# Patient Record
Sex: FEMALE | Race: OTHER | URBAN - METROPOLITAN AREA
[De-identification: names, ages, dates, MRNs, and addresses within clinical notes are randomized per-mention and may not be internally consistent; named-entity substitution may affect disease eponyms.]

---

## 2022-11-04 ENCOUNTER — OFFICE VISIT (OUTPATIENT)
Dept: FAMILY MEDICINE CLINIC | Age: 37
End: 2022-11-04
Payer: COMMERCIAL

## 2022-11-04 VITALS
RESPIRATION RATE: 18 BRPM | WEIGHT: 188 LBS | TEMPERATURE: 97.4 F | DIASTOLIC BLOOD PRESSURE: 90 MMHG | OXYGEN SATURATION: 98 % | BODY MASS INDEX: 35.5 KG/M2 | HEART RATE: 70 BPM | HEIGHT: 61 IN | SYSTOLIC BLOOD PRESSURE: 138 MMHG

## 2022-11-04 DIAGNOSIS — R06.02 SHORTNESS OF BREATH: ICD-10-CM

## 2022-11-04 DIAGNOSIS — R00.2 PALPITATIONS: ICD-10-CM

## 2022-11-04 DIAGNOSIS — Z11.59 NEED FOR HEPATITIS C SCREENING TEST: ICD-10-CM

## 2022-11-04 DIAGNOSIS — E78.5 HYPERLIPIDEMIA, UNSPECIFIED HYPERLIPIDEMIA TYPE: ICD-10-CM

## 2022-11-04 DIAGNOSIS — R06.83 SNORING: ICD-10-CM

## 2022-11-04 DIAGNOSIS — I10 HYPERTENSION, ESSENTIAL: Primary | ICD-10-CM

## 2022-11-04 DIAGNOSIS — K21.9 GASTROESOPHAGEAL REFLUX DISEASE WITHOUT ESOPHAGITIS: ICD-10-CM

## 2022-11-04 PROCEDURE — 93000 ELECTROCARDIOGRAM COMPLETE: CPT | Performed by: NURSE PRACTITIONER

## 2022-11-04 PROCEDURE — 99204 OFFICE O/P NEW MOD 45 MIN: CPT | Performed by: NURSE PRACTITIONER

## 2022-11-04 PROCEDURE — 3080F DIAST BP >= 90 MM HG: CPT | Performed by: NURSE PRACTITIONER

## 2022-11-04 PROCEDURE — 3075F SYST BP GE 130 - 139MM HG: CPT | Performed by: NURSE PRACTITIONER

## 2022-11-04 RX ORDER — PANTOPRAZOLE SODIUM 40 MG/1
40 TABLET, DELAYED RELEASE ORAL DAILY
Qty: 90 TABLET | Refills: 1 | Status: SHIPPED | OUTPATIENT
Start: 2022-11-04

## 2022-11-04 RX ORDER — LISINOPRIL 5 MG/1
5 TABLET ORAL DAILY
Qty: 90 TABLET | Refills: 1 | Status: SHIPPED | OUTPATIENT
Start: 2022-11-04

## 2022-11-04 RX ORDER — AMLODIPINE BESYLATE AND ATORVASTATIN CALCIUM 10; 20 MG/1; MG/1
1 TABLET, FILM COATED ORAL DAILY
COMMUNITY

## 2022-11-04 NOTE — PROGRESS NOTES
Margarita Antonio (: 1985) is a 44 y.o. female, new patient, here for evaluation of the following chief complaint(s):  Establish Care, Headache, Sore Throat, Abdominal Pain, and Dizziness    ASSESSMENT/PLAN:  Below is the assessment and plan developed based on review of pertinent history, physical exam, labs, studies, and medications. Based on today's findings we will proceed as follows:     1. Hypertension, essential  -     CBC WITH AUTOMATED DIFF  -     METABOLIC PANEL, COMPREHENSIVE  -     LIPID PANEL  -     THYROID CASCADE PROFILE  -     lisinopriL (PRINIVIL, ZESTRIL) 5 mg tablet; Take 1 Tablet by mouth daily. Indications: high blood pressure, migraine prevention, Normal, Disp-90 Tablet, R-1  -     AMB POC EKG ROUTINE W/ 12 LEADS, INTER & REP  2. Hyperlipidemia, unspecified hyperlipidemia type  -     CBC WITH AUTOMATED DIFF  -     METABOLIC PANEL, COMPREHENSIVE  -     LIPID PANEL  -     THYROID CASCADE PROFILE  -     MICROALBUMIN, UR, RAND W/ MICROALB/CREAT RATIO  3. Need for hepatitis C screening test  -     HEPATITIS C AB; Future  Due to her symptoms at this time we will perform an EKG in clinic. EKG: normal EKG, normal sinus rhythm, unchanged from previous tracings. With elevated blood pressure at this time we will add lisinopril 5 mg to her regimen to better manage blood pressure. For her GERD patient has been prescribed Protonix to be taken in the morning  Educated on medication, uses, side effects, as well as signs and symptoms that may merit immediate medical attention. Patient is not to share medication and use only as indicated. Patient verbalized understanding and agreement. If symptoms dont improve or worsen call the clinic or go to urgent in the 2-3 days for further evaluation  Given that she has sustained palpitations, dizziness, headaches, snoring, chest tightness patient has also been referred to cardiology for further evaluation.     Routine labs have been ordered. Continue with all other medication as directed. Issues discussed include maintaining a diet, weight control and exercise, continued home blood pressure monitoring, maintaining medication compliance, medication side effects, long term hypertension complications, and refraining from tobacco use. Treatment plan: Continue current medications, no changes, maintaining low sodium diet, blood pressure goal discussed, labwork from today discussed and reviewed, future labwork ordered, A1C, lipids check, patient up to date on maintence and screening exams. Return in about 3 months (around 2/4/2023) for HTN, Hyperlipidemia, Lab review, post sleep study post cardiology, new lisinopril 5 mg. .      SUBJECTIVE/OBJECTIVE:  HPI    Patient is a 44 y.o. F with a history of hypertension and hyperlipidemia presents today with concerns of palpitations, cp, elevated BP, not all the time, with shortness of breath,2 times per 15 days x 6 months, lasting a few seconds, has been taking medication as directed. At times feels \"a sore throat that radiates to the mandible\". Every other day. No symptoms today. Have been told by  she has loud snoring. Dizzy spells that happen at times when walking and sudden dizzy spell frequent most days, lasting a few seconds, with recurrent headache. No symptoms now except for ha, take ibuprofen which helps some. Also with GERD for the past year, feel bloated with all meals, does not take anything for it, with occasional nausea. Review of Systems  All other systems reviewed and are negative. Visit Vitals  BP (!) 138/90 (BP 1 Location: Left upper arm, BP Patient Position: Sitting, BP Cuff Size: Large adult)   Pulse 70   Temp 97.4 °F (36.3 °C) (Temporal)   Resp 18   Ht 5' 1\" (1.549 m)   Wt 188 lb (85.3 kg)   LMP 10/04/2022   SpO2 98%   BMI 35.52 kg/m²       Physical Exam  Constitutional:  No acute distress  HEENT:  Head normocephalic and atraumatic. CV:  Regular rate and rhythm.  No murmur. Respiratory:  Lungs clear to auscultation bilaterally  Abdomen:  Soft, non-tender. Skin:  Normal color. Warm and Dry  Extremities:  Non-tender. No pedal edema. Back:  No tenderness  Neuro:  No gross motor deficits    On this date 11/04/2022 I have spent 45 minutes reviewing previous notes, test results and face to face with the patient discussing the diagnosis and importance of compliance with the treatment plan as well as documenting on the day of the visit. Aspects of this note may have been generated using voice recognition software. Despite editing, there may be some syntax errors. An electronic signature was used to authenticate this note.   -- Molly Noyola NP

## 2022-11-04 NOTE — PROGRESS NOTES
1. Have you been to the ER, urgent care clinic since your last visit? Hospitalized since your last visit? No    2. Have you seen or consulted any other health care providers outside of the [address] [place] [address] since your last visit? Include any pap smears or colon screening.  No    Chief Complaint   Patient presents with    Establish Care    Headache    Sore Throat    Abdominal Pain    Dizziness     Visit Vitals  BP (!) 138/90 (BP 1 Location: Left upper arm, BP Patient Position: Sitting, BP Cuff Size: Large adult)   Pulse 70   Temp 97.4 °F (36.3 °C) (Temporal)   Resp 18   Ht 5' 1\" (1.549 m)   Wt 188 lb (85.3 kg)   LMP 10/04/2022   SpO2 98%   BMI 35.52 kg/m²

## 2023-02-10 ENCOUNTER — OFFICE VISIT (OUTPATIENT)
Dept: FAMILY MEDICINE CLINIC | Age: 38
End: 2023-02-10
Payer: COMMERCIAL

## 2023-02-10 VITALS
OXYGEN SATURATION: 98 % | TEMPERATURE: 98.7 F | WEIGHT: 192.8 LBS | HEIGHT: 61 IN | HEART RATE: 72 BPM | DIASTOLIC BLOOD PRESSURE: 85 MMHG | BODY MASS INDEX: 36.4 KG/M2 | SYSTOLIC BLOOD PRESSURE: 137 MMHG

## 2023-02-10 DIAGNOSIS — K21.9 GASTROESOPHAGEAL REFLUX DISEASE WITHOUT ESOPHAGITIS: ICD-10-CM

## 2023-02-10 DIAGNOSIS — M51.26 LUMBAR HERNIATED DISC: Primary | ICD-10-CM

## 2023-02-10 DIAGNOSIS — E78.5 HYPERLIPIDEMIA LDL GOAL <130: ICD-10-CM

## 2023-02-10 DIAGNOSIS — I10 PRIMARY HYPERTENSION: ICD-10-CM

## 2023-02-10 PROCEDURE — 3074F SYST BP LT 130 MM HG: CPT | Performed by: NURSE PRACTITIONER

## 2023-02-10 PROCEDURE — 3078F DIAST BP <80 MM HG: CPT | Performed by: NURSE PRACTITIONER

## 2023-02-10 PROCEDURE — 99214 OFFICE O/P EST MOD 30 MIN: CPT | Performed by: NURSE PRACTITIONER

## 2023-02-10 RX ORDER — PREDNISONE 20 MG/1
20 TABLET ORAL 2 TIMES DAILY WITH MEALS
Qty: 20 TABLET | Refills: 0 | Status: SHIPPED | OUTPATIENT
Start: 2023-02-10

## 2023-02-10 RX ORDER — NAPROXEN 500 MG/1
500 TABLET ORAL 2 TIMES DAILY WITH MEALS
Qty: 60 TABLET | Refills: 5 | Status: SHIPPED | OUTPATIENT
Start: 2023-02-10

## 2023-02-10 RX ORDER — AMLODIPINE BESYLATE 5 MG/1
5 TABLET ORAL DAILY
Qty: 90 TABLET | Refills: 3 | Status: SHIPPED | OUTPATIENT
Start: 2023-02-10

## 2023-02-10 RX ORDER — AMLODIPINE BESYLATE 5 MG/1
5 TABLET ORAL DAILY
COMMUNITY
Start: 2023-01-11 | End: 2023-02-10 | Stop reason: SDUPTHER

## 2023-02-10 RX ORDER — PANTOPRAZOLE SODIUM 40 MG/1
40 TABLET, DELAYED RELEASE ORAL DAILY
Qty: 90 TABLET | Refills: 3 | Status: SHIPPED | OUTPATIENT
Start: 2023-02-10

## 2023-02-10 RX ORDER — AMLODIPINE BESYLATE AND ATORVASTATIN CALCIUM 10; 20 MG/1; MG/1
1 TABLET, FILM COATED ORAL DAILY
Qty: 90 TABLET | Refills: 3 | Status: SHIPPED | OUTPATIENT
Start: 2023-02-10

## 2023-02-10 RX ORDER — ATORVASTATIN CALCIUM 10 MG/1
10 TABLET, FILM COATED ORAL
Qty: 90 TABLET | Refills: 3 | Status: SHIPPED | OUTPATIENT
Start: 2023-02-10

## 2023-02-10 NOTE — PROGRESS NOTES
1. Have you been to the ER, urgent care clinic since your last visit? Hospitalized since your last visit? No    2. Have you seen or consulted any other health care providers outside of the [address] [place] [address] since your last visit? Include any pap smears or colon screening.  No    Chief Complaint   Patient presents with    Cholesterol Problem    Hypertension     Visit Vitals  /85 (BP 1 Location: Right upper arm, BP Patient Position: Sitting, BP Cuff Size: Large adult)   Pulse 72   Temp 98.7 °F (37.1 °C) (Temporal)   Ht 5' 1\" (1.549 m)   Wt 192 lb 12.8 oz (87.5 kg)   LMP  (LMP Unknown)   SpO2 98%   BMI 36.43 kg/m²

## 2023-02-10 NOTE — PATIENT INSTRUCTIONS
Los siguientes son suplementos de venta haley que pueden ayudar a controlar mejor el colesterol:  CoQ10 200 mg al radha  Arroz de levadura raji  Cúrcuma  niacina  Fibra soluble  Deale  Jengibre     Reform Naproxen 500 mg cuando tenga dolor de [employer] Prednisone 20 mg cuando tenga dolor loren jeet de la espalda.     Empieze Lipitor (atorvastatin) 10 mg todas las noches para el choleterol  Obtenga ella extraída 1 semana antes de fair seguimiento

## 2023-02-23 NOTE — PROGRESS NOTES
Subjective:   Jordan Leon is a 44 y.o. female  established here with complaints of   Chief Complaint   Patient presents with    Cholesterol Problem    Hypertension      Reports blood pressures at home most frequently not checked. Patient has symptoms of none of the following; no chest pain, shortness of breath, weakness, orthostatic hypotension, cough, myalgias, rash, headaches, weight gain, leg swelling, palpitations, slow heart rate, fatigue, depression. Patient reports good compliance with medications, no side effects from medications noted. Current treatments include diet modification, weight loss. Patient Active Problem List   Diagnosis Code    Hypertension, essential I10    Hyperlipidemia E78.5     Patient Active Problem List    Diagnosis Date Noted    Hypertension, essential 11/04/2022    Hyperlipidemia 11/04/2022     Current Outpatient Medications   Medication Sig Dispense Refill    naproxen (NAPROSYN) 500 mg tablet Take 1 Tablet by mouth two (2) times daily (with meals). When needed for pain. 60 Tablet 5    predniSONE (DELTASONE) 20 mg tablet Take 1 Tablet by mouth two (2) times daily (with meals). During flare ups. 20 Tablet 0    atorvastatin (LIPITOR) 10 mg tablet Take 1 Tablet by mouth nightly. Indications: excessive fat in the blood 90 Tablet 3    amLODIPine (NORVASC) 5 mg tablet Take 1 Tablet by mouth daily. Indications: high blood pressure 90 Tablet 3    amLODIPine-atorvastatin (CADUET) 10-20 mg per tablet Take 1 Tablet by mouth daily. Indications: high cholesterol, high blood pressure 90 Tablet 3    pantoprazole (PROTONIX) 40 mg tablet Take 1 Tablet by mouth daily. Indications: gastroesophageal reflux disease 90 Tablet 3    lisinopriL (PRINIVIL, ZESTRIL) 5 mg tablet Take 1 Tablet by mouth daily. Indications: high blood pressure, migraine prevention 90 Tablet 1     No Known Allergies  Past Medical History:   Diagnosis Date    Hypertension      History reviewed.  No pertinent surgical history. Family History   Problem Relation Age of Onset    Diabetes Mother      Social History     Tobacco Use    Smoking status: Never    Smokeless tobacco: Never   Substance Use Topics    Alcohol use: Never      Review of Systems    A comprehensive review of systems was negative except for that written in the HPI. Objective:     Visit Vitals  /85 (BP 1 Location: Right upper arm, BP Patient Position: Sitting, BP Cuff Size: Large adult)   Pulse 72   Temp 98.7 °F (37.1 °C) (Temporal)   Ht 5' 1\" (1.549 m)   Wt 192 lb 12.8 oz (87.5 kg)   LMP  (LMP Unknown)   SpO2 98%   BMI 36.43 kg/m²     General:  Alert, cooperative, no distress, appears stated age. Head:  Normocephalic, without obvious abnormality, atraumatic. Eyes:  Conjunctivae/corneas clear. PERRL, EOMs intact. Fundi benign. Ears:  Normal TMs and external ear canals both ears. Nose: Nares normal. Septum midline. Mucosa normal. No drainage or sinus tenderness. Throat: Lips, mucosa, and tongue normal. Teeth and gums normal.   Neck: Supple, symmetrical, trachea midline, no adenopathy, thyroid: no enlargement/tenderness/nodules, no carotid bruit and no JVD. Back:   Symmetric, no curvature. ROM normal. No CVA tenderness. Lungs:   Clear to auscultation bilaterally. Chest wall:  No tenderness or deformity. Heart:  Regular rate and rhythm, S1, S2 normal, no murmur, click, rub or gallop. Assessment/Plan:       ICD-10-CM ICD-9-CM    1. Lumbar herniated disc  M51.26 722.10 naproxen (NAPROSYN) 500 mg tablet      predniSONE (DELTASONE) 20 mg tablet      2.  Hyperlipidemia LDL goal <130  E78.5 272.4 atorvastatin (LIPITOR) 10 mg tablet      amLODIPine-atorvastatin (CADUET) 10-20 mg per tablet      CBC WITH AUTOMATED DIFF      METABOLIC PANEL, COMPREHENSIVE      LIPID PANEL      3. Primary hypertension  I10 401.9 amLODIPine (NORVASC) 5 mg tablet      amLODIPine-atorvastatin (CADUET) 10-20 mg per tablet      CBC WITH AUTOMATED DIFF METABOLIC PANEL, COMPREHENSIVE      LIPID PANEL      4. Gastroesophageal reflux disease without esophagitis  K21.9 530.81 pantoprazole (PROTONIX) 40 mg tablet        Follow-up and Dispositions    Return in about 6 months (around 8/10/2023) for Hyperlipidemia, Low back pain prednisone/naproxen flareups? .        Will continue with the Amlodipine 15 per cardiology  Will increase Lipitor to 30 mg QHS  Educated on medication, uses, side effects, as well as signs and symptoms that may merit immediate medical attention. Patient is not to share medication and use only as indicated. Patient verbalized understanding and agreement. Issues discussed include maintaining a proper diet, maintaining medication compliance, medication side effects, future potential medication changes if needed. Treatment plan: Continue current medications, labwork from today discussed and reviewed, future labwork ordered, A1C, lipids check, patient up to date on maintence and screening exams. Follow-up and Dispositions    Return in about 6 months (around 8/10/2023) for Hyperlipidemia, Low back pain prednisone/naproxen flareups? Iesha Rai On this date 02/10/2023 I have spent 33 minutes reviewing previous notes, test results and face to face (virtual) with the patient discussing the diagnosis and importance of compliance with the treatment plan as well as documenting on the day of the visit. Aspects of this note may have been generated using voice recognition software. Despite editing, there may be some syntax errors.     Valeri Ronquillo NP

## 2023-03-15 DIAGNOSIS — E78.5 HYPERLIPIDEMIA LDL GOAL <130: ICD-10-CM

## 2023-03-16 RX ORDER — ATORVASTATIN CALCIUM 10 MG/1
10 TABLET, FILM COATED ORAL
Qty: 90 TABLET | Refills: 3 | Status: SHIPPED | OUTPATIENT
Start: 2023-03-16

## 2023-03-16 NOTE — TELEPHONE ENCOUNTER
Requested Prescriptions     Pending Prescriptions Disp Refills    atorvastatin (LIPITOR) 10 mg tablet 90 Tablet 3     Sig: Take 1 Tablet by mouth nightly.  Indications: excessive fat in the blood        Last OV:11/4/22  Next OV:8/11/23  Last Refill:2/10/23  Qty 90  Refills 3

## 2023-07-18 LAB
ALBUMIN SERPL-MCNC: 4 G/DL (ref 3.9–4.9)
ALBUMIN/GLOB SERPL: 1.7 {RATIO} (ref 1.2–2.2)
ALP SERPL-CCNC: 54 IU/L (ref 44–121)
ALT SERPL-CCNC: 16 IU/L (ref 0–32)
AST SERPL-CCNC: 12 IU/L (ref 0–40)
BASOPHILS # BLD AUTO: 0 X10E3/UL (ref 0–0.2)
BASOPHILS NFR BLD AUTO: 0 %
BILIRUB SERPL-MCNC: <0.2 MG/DL (ref 0–1.2)
BUN SERPL-MCNC: 11 MG/DL (ref 6–20)
BUN/CREAT SERPL: 16 (ref 9–23)
CALCIUM SERPL-MCNC: 9.4 MG/DL (ref 8.7–10.2)
CHLORIDE SERPL-SCNC: 107 MMOL/L (ref 96–106)
CHOLEST SERPL-MCNC: 129 MG/DL (ref 100–199)
CO2 SERPL-SCNC: 21 MMOL/L (ref 20–29)
CREAT SERPL-MCNC: 0.67 MG/DL (ref 0.57–1)
EGFRCR SERPLBLD CKD-EPI 2021: 115 ML/MIN/1.73
EOSINOPHIL # BLD AUTO: 0.2 X10E3/UL (ref 0–0.4)
EOSINOPHIL NFR BLD AUTO: 3 %
ERYTHROCYTE [DISTWIDTH] IN BLOOD BY AUTOMATED COUNT: 13.4 % (ref 11.7–15.4)
GLOBULIN SER CALC-MCNC: 2.4 G/DL (ref 1.5–4.5)
GLUCOSE SERPL-MCNC: 95 MG/DL (ref 70–99)
HCT VFR BLD AUTO: 35.8 % (ref 34–46.6)
HDLC SERPL-MCNC: 49 MG/DL
HGB BLD-MCNC: 11.4 G/DL (ref 11.1–15.9)
IMM GRANULOCYTES # BLD AUTO: 0 X10E3/UL (ref 0–0.1)
IMM GRANULOCYTES NFR BLD AUTO: 0 %
LDLC SERPL CALC-MCNC: 54 MG/DL (ref 0–99)
LYMPHOCYTES # BLD AUTO: 2.6 X10E3/UL (ref 0.7–3.1)
LYMPHOCYTES NFR BLD AUTO: 28 %
MCH RBC QN AUTO: 28.1 PG (ref 26.6–33)
MCHC RBC AUTO-ENTMCNC: 31.8 G/DL (ref 31.5–35.7)
MCV RBC AUTO: 88 FL (ref 79–97)
MONOCYTES # BLD AUTO: 0.5 X10E3/UL (ref 0.1–0.9)
MONOCYTES NFR BLD AUTO: 6 %
NEUTROPHILS # BLD AUTO: 5.8 X10E3/UL (ref 1.4–7)
NEUTROPHILS NFR BLD AUTO: 63 %
PLATELET # BLD AUTO: 248 X10E3/UL (ref 150–450)
POTASSIUM SERPL-SCNC: 4.1 MMOL/L (ref 3.5–5.2)
PROT SERPL-MCNC: 6.4 G/DL (ref 6–8.5)
RBC # BLD AUTO: 4.06 X10E6/UL (ref 3.77–5.28)
SODIUM SERPL-SCNC: 140 MMOL/L (ref 134–144)
TRIGL SERPL-MCNC: 151 MG/DL (ref 0–149)
VLDLC SERPL CALC-MCNC: 26 MG/DL (ref 5–40)
WBC # BLD AUTO: 9.1 X10E3/UL (ref 3.4–10.8)

## 2023-07-18 NOTE — RESULT ENCOUNTER NOTE
Results have been reviewed and abnormal results noted. Cholesterol slightly improved from previous, we will not make any changes to current regimen. Advised to increase exercise to include 30 minutes 5 days/week. And reduce high fat content food such as red meats, fried food, processed food. Follow-up as previously scheduled or sooner as needed.

## 2023-07-31 ENCOUNTER — HOSPITAL ENCOUNTER (EMERGENCY)
Facility: HOSPITAL | Age: 38
Discharge: HOME | End: 2023-07-31
Payer: COMMERCIAL

## 2023-07-31 ENCOUNTER — APPOINTMENT (OUTPATIENT)
Facility: HOSPITAL | Age: 38
End: 2023-07-31
Payer: COMMERCIAL

## 2023-07-31 VITALS
OXYGEN SATURATION: 100 % | HEART RATE: 74 BPM | DIASTOLIC BLOOD PRESSURE: 82 MMHG | HEIGHT: 63 IN | TEMPERATURE: 98.4 F | WEIGHT: 190 LBS | RESPIRATION RATE: 18 BRPM | SYSTOLIC BLOOD PRESSURE: 145 MMHG | BODY MASS INDEX: 33.66 KG/M2

## 2023-07-31 DIAGNOSIS — Z3A.01 PREGNANCY WITH 7 COMPLETED WEEKS GESTATION: Primary | ICD-10-CM

## 2023-07-31 DIAGNOSIS — R10.9 ABDOMINAL PAIN, UNSPECIFIED ABDOMINAL LOCATION: ICD-10-CM

## 2023-07-31 LAB
ALBUMIN SERPL-MCNC: 3.5 G/DL (ref 3.5–5)
ALBUMIN/GLOB SERPL: 0.9 (ref 1.1–2.2)
ALP SERPL-CCNC: 59 U/L (ref 45–117)
ALT SERPL-CCNC: 24 U/L (ref 12–78)
AMORPH CRY URNS QL MICRO: ABNORMAL
ANION GAP SERPL CALC-SCNC: 9 MMOL/L (ref 5–15)
APPEARANCE UR: CLEAR
AST SERPL W P-5'-P-CCNC: 20 U/L (ref 15–37)
BACTERIA URNS QL MICRO: NEGATIVE /HPF
BASOPHILS # BLD: 0 K/UL (ref 0–0.1)
BASOPHILS NFR BLD: 0 % (ref 0–1)
BILIRUB SERPL-MCNC: 0.3 MG/DL (ref 0.2–1)
BILIRUB UR QL: NEGATIVE
BUN SERPL-MCNC: 9 MG/DL (ref 6–20)
BUN/CREAT SERPL: 11 (ref 12–20)
CA-I BLD-MCNC: 9.6 MG/DL (ref 8.5–10.1)
CHLORIDE SERPL-SCNC: 104 MMOL/L (ref 97–108)
CO2 SERPL-SCNC: 26 MMOL/L (ref 21–32)
COLOR UR: YELLOW
CREAT SERPL-MCNC: 0.8 MG/DL (ref 0.55–1.02)
DIFFERENTIAL METHOD BLD: ABNORMAL
EOSINOPHIL # BLD: 0.1 K/UL (ref 0–0.4)
EOSINOPHIL NFR BLD: 1 % (ref 0–7)
EPITH CASTS URNS QL MICRO: ABNORMAL /LPF
ERYTHROCYTE [DISTWIDTH] IN BLOOD BY AUTOMATED COUNT: 14.4 % (ref 11.5–14.5)
GLOBULIN SER CALC-MCNC: 4 G/DL (ref 2–4)
GLUCOSE SERPL-MCNC: 95 MG/DL (ref 65–100)
GLUCOSE UR STRIP.AUTO-MCNC: NEGATIVE MG/DL
HCG SERPL-ACNC: ABNORMAL MIU/ML (ref 0–6)
HCG UR QL: POSITIVE
HCT VFR BLD AUTO: 38.6 % (ref 35–47)
HGB BLD-MCNC: 12.7 G/DL (ref 11.5–16)
HGB UR QL STRIP: ABNORMAL
IMM GRANULOCYTES # BLD AUTO: 0 K/UL (ref 0–0.04)
IMM GRANULOCYTES NFR BLD AUTO: 0 % (ref 0–0.5)
KETONES UR QL STRIP.AUTO: 15 MG/DL
LEUKOCYTE ESTERASE UR QL STRIP.AUTO: NEGATIVE
LIPASE SERPL-CCNC: 36 U/L (ref 73–393)
LYMPHOCYTES # BLD: 2.4 K/UL (ref 0.8–3.5)
LYMPHOCYTES NFR BLD: 21 % (ref 12–49)
MCH RBC QN AUTO: 28.6 PG (ref 26–34)
MCHC RBC AUTO-ENTMCNC: 32.9 G/DL (ref 30–36.5)
MCV RBC AUTO: 86.9 FL (ref 80–99)
MONOCYTES # BLD: 0.6 K/UL (ref 0–1)
MONOCYTES NFR BLD: 5 % (ref 5–13)
NEUTS SEG # BLD: 8.1 K/UL (ref 1.8–8)
NEUTS SEG NFR BLD: 73 % (ref 32–75)
NITRITE UR QL STRIP.AUTO: NEGATIVE
PH UR STRIP: 5 (ref 5–8)
PLATELET # BLD AUTO: 244 K/UL (ref 150–400)
PMV BLD AUTO: 10.9 FL (ref 8.9–12.9)
POTASSIUM SERPL-SCNC: 3.5 MMOL/L (ref 3.5–5.1)
PROT SERPL-MCNC: 7.5 G/DL (ref 6.4–8.2)
PROT UR STRIP-MCNC: NEGATIVE MG/DL
RBC # BLD AUTO: 4.44 M/UL (ref 3.8–5.2)
RBC #/AREA URNS HPF: ABNORMAL /HPF (ref 0–5)
SODIUM SERPL-SCNC: 139 MMOL/L (ref 136–145)
SP GR UR REFRACTOMETRY: 1.02 (ref 1–1.03)
URINE CULTURE IF INDICATED: ABNORMAL
UROBILINOGEN UR QL STRIP.AUTO: 0.1 EU/DL (ref 0.2–1)
WBC # BLD AUTO: 11.2 K/UL (ref 3.6–11)
WBC URNS QL MICRO: ABNORMAL /HPF (ref 0–4)
YEAST URNS QL MICRO: PRESENT

## 2023-07-31 PROCEDURE — 83690 ASSAY OF LIPASE: CPT

## 2023-07-31 PROCEDURE — 96361 HYDRATE IV INFUSION ADD-ON: CPT

## 2023-07-31 PROCEDURE — 99284 EMERGENCY DEPT VISIT MOD MDM: CPT

## 2023-07-31 PROCEDURE — 6370000000 HC RX 637 (ALT 250 FOR IP)

## 2023-07-31 PROCEDURE — 81001 URINALYSIS AUTO W/SCOPE: CPT

## 2023-07-31 PROCEDURE — 76705 ECHO EXAM OF ABDOMEN: CPT

## 2023-07-31 PROCEDURE — 84702 CHORIONIC GONADOTROPIN TEST: CPT

## 2023-07-31 PROCEDURE — 81025 URINE PREGNANCY TEST: CPT

## 2023-07-31 PROCEDURE — 6360000002 HC RX W HCPCS

## 2023-07-31 PROCEDURE — 76801 OB US < 14 WKS SINGLE FETUS: CPT

## 2023-07-31 PROCEDURE — 96374 THER/PROPH/DIAG INJ IV PUSH: CPT

## 2023-07-31 PROCEDURE — 80053 COMPREHEN METABOLIC PANEL: CPT

## 2023-07-31 PROCEDURE — 2580000003 HC RX 258

## 2023-07-31 PROCEDURE — 85025 COMPLETE CBC W/AUTO DIFF WBC: CPT

## 2023-07-31 RX ORDER — 0.9 % SODIUM CHLORIDE 0.9 %
1000 INTRAVENOUS SOLUTION INTRAVENOUS ONCE
Status: COMPLETED | OUTPATIENT
Start: 2023-07-31 | End: 2023-07-31

## 2023-07-31 RX ORDER — ONDANSETRON 4 MG/1
4 TABLET, FILM COATED ORAL 3 TIMES DAILY PRN
Qty: 8 TABLET | Refills: 0 | Status: SHIPPED | OUTPATIENT
Start: 2023-07-31

## 2023-07-31 RX ORDER — ACETAMINOPHEN 500 MG
500 TABLET ORAL 4 TIMES DAILY PRN
Qty: 120 TABLET | Refills: 0 | Status: SHIPPED | OUTPATIENT
Start: 2023-07-31

## 2023-07-31 RX ORDER — ONDANSETRON 2 MG/ML
4 INJECTION INTRAMUSCULAR; INTRAVENOUS ONCE
Status: COMPLETED | OUTPATIENT
Start: 2023-07-31 | End: 2023-07-31

## 2023-07-31 RX ORDER — ACETAMINOPHEN 325 MG/1
650 TABLET ORAL
Status: COMPLETED | OUTPATIENT
Start: 2023-07-31 | End: 2023-07-31

## 2023-07-31 RX ADMIN — SODIUM CHLORIDE 1000 ML: 9 INJECTION, SOLUTION INTRAVENOUS at 17:35

## 2023-07-31 RX ADMIN — ACETAMINOPHEN 650 MG: 325 TABLET ORAL at 17:36

## 2023-07-31 RX ADMIN — ONDANSETRON 4 MG: 2 INJECTION INTRAMUSCULAR; INTRAVENOUS at 17:36

## 2023-07-31 ASSESSMENT — PAIN - FUNCTIONAL ASSESSMENT
PAIN_FUNCTIONAL_ASSESSMENT: 0-10
PAIN_FUNCTIONAL_ASSESSMENT: 0-10

## 2023-07-31 ASSESSMENT — PAIN DESCRIPTION - PAIN TYPE: TYPE: ACUTE PAIN

## 2023-07-31 ASSESSMENT — PAIN DESCRIPTION - LOCATION
LOCATION: ABDOMEN
LOCATION: ABDOMEN

## 2023-07-31 ASSESSMENT — PAIN SCALES - GENERAL
PAINLEVEL_OUTOF10: 8

## 2023-07-31 ASSESSMENT — PAIN DESCRIPTION - DESCRIPTORS: DESCRIPTORS: CRAMPING

## 2023-07-31 NOTE — ED PROVIDER NOTES
this with Dr. Jalaine Hammans and as diflucan is harmful in pregnancy and patient is not symptomatic will hold off on treatment. [CL]   1834 HCG, Quantitative, Pregnancy(!):    hCG Quant 62,660(!) [CL]   1835 hCG Quant(!): 63,46 [CL]   1914 US OB LESS THAN 14 WEEKS SINGLE OR FIRST GESTATION     IMPRESSION:  Single viable intrauterine pregnancy with estimated gestational age  of 7 weeks 5 days. Normal ovaries. [CL]   1914 US GALLBLADDER RUQ  IMPRESSION:  No acute process. [CL]   1937 Discussed all results with patient, at this time there is no explanation for her symptoms. She has not had any additional pain since she has been here with administration of Tylenol. Discussed tricked return precautions in regards to any new or worsening of symptoms, patient verbalized understanding is in agreement treatment plan in place. Will provide OB/GYN for patient to call and follow-up with for appointment. [CL]      ED Course User Index  [CL] Saulo Carnes PA-C       Disposition Considerations (Tests not done, Shared Decision Making, Pt Expectation of Test or Treatment.): Not Applicable    Patient was given the following medications:  Medications   sodium chloride 0.9 % bolus 1,000 mL (0 mLs IntraVENous Stopped 7/31/23 1940)   ondansetron (ZOFRAN) injection 4 mg (4 mg IntraVENous Given 7/31/23 1736)   acetaminophen (TYLENOL) tablet 650 mg (650 mg Oral Given 7/31/23 1736)       CONSULTS: (Who and What was discussed)  None     Social Determinants affecting Dx or Tx: None    Smoking Cessation: Not Applicable    PROCEDURES   Unless otherwise noted above, none. Procedures      CRITICAL CARE TIME   Patient does not meet Critical Care Time, 0 minutes    FINAL IMPRESSION     1. Pregnancy with 7 completed weeks gestation    2. Abdominal pain, unspecified abdominal location          DISPOSITION/PLAN   DISPOSITION Decision To Discharge 07/31/2023 07:32:53 PM    Discharge Note: The patient is stable for discharge home.  The signs,

## 2023-07-31 NOTE — DISCHARGE INSTRUCTIONS
Total Bilirubin 0.3 0.2 - 1.0 mg/dL    AST 20 15 - 37 U/L    ALT 24 12 - 78 U/L    Alk Phosphatase 59 45 - 117 U/L    Total Protein 7.5 6.4 - 8.2 g/dL    Albumin 3.5 3.5 - 5.0 g/dL    Globulin 4.0 2.0 - 4.0 g/dL    Albumin/Globulin Ratio 0.9 (L) 1.1 - 2.2     Urinalysis with Reflex to Culture    Collection Time: 07/31/23  5:09 PM    Specimen: Urine   Result Value Ref Range    Color, UA Yellow      Appearance Clear Clear      Specific Gravity, UA 1.020 1.003 - 1.030      pH, Urine 5.0 5.0 - 8.0      Protein, UA Negative Negative mg/dL    Glucose, UA Negative Negative mg/dL    Ketones, Urine 15 (A) Negative mg/dL    Bilirubin Urine Negative Negative      Blood, Urine Trace (A) Negative      Urobilinogen, Urine 0.1 (L) 0.2 - 1.0 EU/dL    Nitrite, Urine Negative Negative      Leukocyte Esterase, Urine Negative Negative      WBC, UA 0-4 0 - 4 /hpf    RBC, UA 0-5 0 - 5 /hpf    Epithelial Cells UA Few Few /lpf    BACTERIA, URINE Negative Negative /hpf    Urine Culture if Indicated Culture not indicated by UA result Culture not indicated by UA result      Amorphous Crystal 1+ (A) Negative    Yeast, UA Present (A) Negative     HCG, Quantitative, Pregnancy    Collection Time: 07/31/23  5:09 PM   Result Value Ref Range    hCG Quant 62,660 (H) 0 - 6 mIU/mL   POC Pregnancy Urine Qual    Collection Time: 07/31/23  5:10 PM   Result Value Ref Range    Preg Test, Ur Positive (A) Negative         Radiologic Studies  US OB LESS THAN 14 WEEKS SINGLE OR FIRST GESTATION   Final Result   Single viable intrauterine pregnancy with estimated gestational age   of 7 weeks 5 days. Normal ovaries. US GALLBLADDER RUQ   Final Result   No acute process. [unfilled]  [unfilled]  ------------------------------------------------------------------------------------------------------------  The exam and treatment you received in the Emergency Department were for an urgent problem and are not intended as complete care.  It is important that you

## 2023-07-31 NOTE — ED NOTES
Pt returned from [facility] via [place] [facility].      Gerard Badillo RN  07/31/23 4504

## 2023-07-31 NOTE — ED TRIAGE NOTES
Pt is Wolof speaking.  is interpreting. C/o of diffuse abdominal pain that began last night. Pain comes and goes. Last BM was this morning states was normal consistency and color. Denies vomiting.

## 2023-08-08 SDOH — ECONOMIC STABILITY: FOOD INSECURITY: WITHIN THE PAST 12 MONTHS, YOU WORRIED THAT YOUR FOOD WOULD RUN OUT BEFORE YOU GOT MONEY TO BUY MORE.: SOMETIMES TRUE

## 2023-08-08 SDOH — ECONOMIC STABILITY: INCOME INSECURITY: HOW HARD IS IT FOR YOU TO PAY FOR THE VERY BASICS LIKE FOOD, HOUSING, MEDICAL CARE, AND HEATING?: NOT VERY HARD

## 2023-08-08 SDOH — ECONOMIC STABILITY: FOOD INSECURITY: WITHIN THE PAST 12 MONTHS, THE FOOD YOU BOUGHT JUST DIDN'T LAST AND YOU DIDN'T HAVE MONEY TO GET MORE.: SOMETIMES TRUE

## 2023-08-08 SDOH — ECONOMIC STABILITY: TRANSPORTATION INSECURITY
IN THE PAST 12 MONTHS, HAS LACK OF TRANSPORTATION KEPT YOU FROM MEETINGS, WORK, OR FROM GETTING THINGS NEEDED FOR DAILY LIVING?: PATIENT DECLINED

## 2023-08-08 SDOH — ECONOMIC STABILITY: HOUSING INSECURITY
IN THE LAST 12 MONTHS, WAS THERE A TIME WHEN YOU DID NOT HAVE A STEADY PLACE TO SLEEP OR SLEPT IN A SHELTER (INCLUDING NOW)?: PATIENT REFUSED

## 2023-08-11 ENCOUNTER — OFFICE VISIT (OUTPATIENT)
Facility: CLINIC | Age: 38
End: 2023-08-11
Payer: COMMERCIAL

## 2023-08-11 VITALS
WEIGHT: 197 LBS | SYSTOLIC BLOOD PRESSURE: 133 MMHG | HEIGHT: 63 IN | HEART RATE: 79 BPM | DIASTOLIC BLOOD PRESSURE: 99 MMHG | BODY MASS INDEX: 34.91 KG/M2 | OXYGEN SATURATION: 98 % | TEMPERATURE: 97.7 F

## 2023-08-11 DIAGNOSIS — K21.9 GASTROESOPHAGEAL REFLUX DISEASE WITHOUT ESOPHAGITIS: ICD-10-CM

## 2023-08-11 DIAGNOSIS — I10 HYPERTENSION, ESSENTIAL: ICD-10-CM

## 2023-08-11 DIAGNOSIS — Z3A.09 9 WEEKS GESTATION OF PREGNANCY: Primary | ICD-10-CM

## 2023-08-11 DIAGNOSIS — E78.5 HYPERLIPIDEMIA, UNSPECIFIED HYPERLIPIDEMIA TYPE: ICD-10-CM

## 2023-08-11 PROCEDURE — 3078F DIAST BP <80 MM HG: CPT | Performed by: NURSE PRACTITIONER

## 2023-08-11 PROCEDURE — 3074F SYST BP LT 130 MM HG: CPT | Performed by: NURSE PRACTITIONER

## 2023-08-11 PROCEDURE — 99214 OFFICE O/P EST MOD 30 MIN: CPT | Performed by: NURSE PRACTITIONER

## 2023-08-11 RX ORDER — LABETALOL 200 MG/1
200 TABLET, FILM COATED ORAL 2 TIMES DAILY
Qty: 60 TABLET | Refills: 3 | Status: SHIPPED | OUTPATIENT
Start: 2023-08-11

## 2023-08-11 RX ORDER — PANTOPRAZOLE SODIUM 40 MG/1
40 TABLET, DELAYED RELEASE ORAL DAILY
Qty: 30 TABLET | Refills: 5 | Status: SHIPPED | OUTPATIENT
Start: 2023-08-11

## 2023-08-11 NOTE — PROGRESS NOTES
Subjective:   Ang Pappas is a 44 y.o. female  established here with complaints of Hypertension and Cholesterol Problem   Reports went to the ER for abdominal pain and found that she was pregnant. This week she is 9 weeks pregnant has appointment with OB/GYN scheduled for next month. Has not had any bleeding or any other issues. Started over-the-counter prenatal vitamins. Patient presenting for hypertension and hyperlipidemia followup. Reports stopped taking lisinopril since finding out she was pregnant. Patient reports blood pressures at home most frequently not checked . Patient reports none Patient denies chest pain, shortness of breath, weakness, orthostatic hypotension, cough, myalgias, rash, headaches, weight gain, leg swelling, palpitations, slow heart rate, fatigue, and depression. Patient reports good compliance with medications, no side effects from medications noted. Current treatments include diet modification, weight loss. Patient Active Problem List   Diagnosis    Hypertension, essential    Hyperlipidemia      Current Outpatient Medications   Medication Sig Dispense Refill    labetalol (NORMODYNE) 200 MG tablet Take 1 tablet by mouth 2 times daily 60 tablet 3    pantoprazole (PROTONIX) 40 MG tablet Take 1 tablet by mouth daily 30 tablet 5    acetaminophen (TYLENOL) 500 MG tablet Take 1 tablet by mouth 4 times daily as needed for Pain 120 tablet 0    ondansetron (ZOFRAN) 4 MG tablet Take 1 tablet by mouth 3 times daily as needed for Nausea or Vomiting 8 tablet 0     No current facility-administered medications for this visit. No Known Allergies   Past Medical History:   Diagnosis Date    Hypertension       History reviewed. No pertinent surgical history.    Family History   Problem Relation Age of Onset    Diabetes Mother       Social History     Tobacco Use    Smoking status: Never    Smokeless tobacco: Never   Substance Use Topics    Alcohol use: Never        Review of

## 2023-08-11 NOTE — PATIENT INSTRUCTIONS
Sabina el embrazo puede gianluca  acetaminophen (TYLENOL) 500 MG tablet  labetalol (NORMODYNE) 200 MG tablet (para la presion)  ondansetron (ZOFRAN) 4 MG tablet (Para las nauseas)  pantoprazole (PROTONIX) 40 MG tablet (para la acided estomacal/reflujos)

## 2024-04-23 RX ORDER — AMLODIPINE BESYLATE 10 MG/1
10 TABLET ORAL DAILY
COMMUNITY
Start: 2024-04-08 | End: 2025-04-08

## 2024-04-23 RX ORDER — HYDROCORTISONE 25 MG/G
CREAM TOPICAL 2 TIMES DAILY
COMMUNITY
Start: 2024-03-01

## 2024-04-23 RX ORDER — IBUPROFEN 600 MG/1
600 TABLET ORAL EVERY 6 HOURS PRN
COMMUNITY
Start: 2024-03-01 | End: 2024-04-24 | Stop reason: ALTCHOICE

## 2024-04-23 RX ORDER — ASPIRIN 81 MG/1
81 TABLET ORAL DAILY
COMMUNITY
Start: 2023-09-18 | End: 2024-09-17

## 2024-04-23 RX ORDER — FAMOTIDINE 20 MG/1
20 TABLET, FILM COATED ORAL 2 TIMES DAILY
COMMUNITY
Start: 2023-10-09 | End: 2024-04-24 | Stop reason: ALTCHOICE

## 2024-04-23 RX ORDER — HYDROCHLOROTHIAZIDE 25 MG/1
25 TABLET ORAL DAILY
COMMUNITY
Start: 2024-04-08 | End: 2024-04-24 | Stop reason: ALTCHOICE

## 2024-04-24 ENCOUNTER — OFFICE VISIT (OUTPATIENT)
Facility: CLINIC | Age: 39
End: 2024-04-24
Payer: COMMERCIAL

## 2024-04-24 VITALS
WEIGHT: 189 LBS | TEMPERATURE: 97.5 F | OXYGEN SATURATION: 97 % | BODY MASS INDEX: 33.49 KG/M2 | HEART RATE: 87 BPM | RESPIRATION RATE: 18 BRPM | HEIGHT: 63 IN | SYSTOLIC BLOOD PRESSURE: 165 MMHG | DIASTOLIC BLOOD PRESSURE: 93 MMHG

## 2024-04-24 DIAGNOSIS — K64.4 EXTERNAL HEMORRHOID: ICD-10-CM

## 2024-04-24 DIAGNOSIS — I10 HYPERTENSION, ESSENTIAL: Primary | ICD-10-CM

## 2024-04-24 PROCEDURE — 3080F DIAST BP >= 90 MM HG: CPT | Performed by: STUDENT IN AN ORGANIZED HEALTH CARE EDUCATION/TRAINING PROGRAM

## 2024-04-24 PROCEDURE — 3077F SYST BP >= 140 MM HG: CPT | Performed by: STUDENT IN AN ORGANIZED HEALTH CARE EDUCATION/TRAINING PROGRAM

## 2024-04-24 PROCEDURE — 99215 OFFICE O/P EST HI 40 MIN: CPT | Performed by: STUDENT IN AN ORGANIZED HEALTH CARE EDUCATION/TRAINING PROGRAM

## 2024-04-24 RX ORDER — NIFEDIPINE 30 MG/1
TABLET, EXTENDED RELEASE ORAL
COMMUNITY
Start: 2024-03-01 | End: 2024-04-24 | Stop reason: ALTCHOICE

## 2024-04-24 RX ORDER — BENZOCAINE/MENTHOL 6 MG-10 MG
LOZENGE MUCOUS MEMBRANE
Qty: 30 G | Refills: 1 | Status: SHIPPED | OUTPATIENT
Start: 2024-04-24 | End: 2024-05-01

## 2024-04-24 RX ORDER — LISINOPRIL 5 MG/1
5 TABLET ORAL DAILY
Qty: 30 TABLET | Refills: 0 | Status: SHIPPED | OUTPATIENT
Start: 2024-04-24

## 2024-04-24 ASSESSMENT — PATIENT HEALTH QUESTIONNAIRE - PHQ9
6. FEELING BAD ABOUT YOURSELF - OR THAT YOU ARE A FAILURE OR HAVE LET YOURSELF OR YOUR FAMILY DOWN: NOT AT ALL
SUM OF ALL RESPONSES TO PHQ9 QUESTIONS 1 & 2: 6
5. POOR APPETITE OR OVEREATING: NOT AT ALL
SUM OF ALL RESPONSES TO PHQ QUESTIONS 1-9: 10
SUM OF ALL RESPONSES TO PHQ QUESTIONS 1-9: 10
1. LITTLE INTEREST OR PLEASURE IN DOING THINGS: NEARLY EVERY DAY
9. THOUGHTS THAT YOU WOULD BE BETTER OFF DEAD, OR OF HURTING YOURSELF: NOT AT ALL
2. FEELING DOWN, DEPRESSED OR HOPELESS: NEARLY EVERY DAY
8. MOVING OR SPEAKING SO SLOWLY THAT OTHER PEOPLE COULD HAVE NOTICED. OR THE OPPOSITE, BEING SO FIGETY OR RESTLESS THAT YOU HAVE BEEN MOVING AROUND A LOT MORE THAN USUAL: NOT AT ALL
10. IF YOU CHECKED OFF ANY PROBLEMS, HOW DIFFICULT HAVE THESE PROBLEMS MADE IT FOR YOU TO DO YOUR WORK, TAKE CARE OF THINGS AT HOME, OR GET ALONG WITH OTHER PEOPLE: SOMEWHAT DIFFICULT
7. TROUBLE CONCENTRATING ON THINGS, SUCH AS READING THE NEWSPAPER OR WATCHING TELEVISION: NOT AT ALL
4. FEELING TIRED OR HAVING LITTLE ENERGY: NEARLY EVERY DAY
SUM OF ALL RESPONSES TO PHQ QUESTIONS 1-9: 10
SUM OF ALL RESPONSES TO PHQ QUESTIONS 1-9: 10
3. TROUBLE FALLING OR STAYING ASLEEP: SEVERAL DAYS

## 2024-04-24 ASSESSMENT — ANXIETY QUESTIONNAIRES
3. WORRYING TOO MUCH ABOUT DIFFERENT THINGS: NOT AT ALL
1. FEELING NERVOUS, ANXIOUS, OR ON EDGE: NOT AT ALL
IF YOU CHECKED OFF ANY PROBLEMS ON THIS QUESTIONNAIRE, HOW DIFFICULT HAVE THESE PROBLEMS MADE IT FOR YOU TO DO YOUR WORK, TAKE CARE OF THINGS AT HOME, OR GET ALONG WITH OTHER PEOPLE: NOT DIFFICULT AT ALL
7. FEELING AFRAID AS IF SOMETHING AWFUL MIGHT HAPPEN: NOT AT ALL
6. BECOMING EASILY ANNOYED OR IRRITABLE: NOT AT ALL
5. BEING SO RESTLESS THAT IT IS HARD TO SIT STILL: NOT AT ALL
4. TROUBLE RELAXING: NOT AT ALL

## 2024-04-24 NOTE — PATIENT INSTRUCTIONS
Decrease labetalol to 1 tablet three times a day for one week   Then take 1 tablet twice daily for three days  Then take 1 tablet daily for three days    Starting lisinopril 5 mg 4/24/2024    Once done with labetalol increase lisinopril to 10 mg daily if pressures are higher than 140 for top number or 90 for bottom number.

## 2024-04-24 NOTE — PROGRESS NOTES
[facility]  Subjective       Chief Complaint   Patient presents with    Hypertension    Hemorrhoids    postpartum depression     HPI:  Dede Vasques is a 38 y.o. female.    Recently gave birth (saw [facility] health gynecology)    Had chronic hypertension with postpartum preeclampsia/eclampsia  Had a bilateral salpingectomy     HYPERTENSION, Essential  Diagnosed:   Reports Compliance with meds:  Amlodopine 10 mg daily  Labetalol (Two tablets, two in noon and two at night), does not know the tablet amount?  Checking BP at Home: 160/80s  Says watching the salt in the diet \"doesn't add\"   Says typical meals look like: eggs/beans/cheese chicken/meats    Hemorrhoids   Worse since delivery  Tried an ointment not sure what med  Still feels like a pulse    PHQ-9 Total Score: 10 (2024  4:14 PM)  Thoughts that you would be better off dead, or of hurting yourself in some way: 0 (2024  4:14 PM)      Past Medical History:   Diagnosis Date    GERD (gastroesophageal reflux disease)     Hypertension      Current Outpatient Medications   Medication Sig Dispense Refill    lisinopril (PRINIVIL;ZESTRIL) 5 MG tablet Take 1 tablet by mouth daily Increase to 10 mg if still greater than 140/90 and stopping labetalol 30 tablet 0    hydrocortisone (ALA-CAIT) 1 % cream Apply topically 2 times daily for one week 30 g 1    amLODIPine (NORVASC) 10 MG tablet Take 1 tablet by mouth daily      aspirin 81 MG EC tablet Take 1 tablet by mouth daily      hydrocortisone (ANUSOL-HC) 2.5 % CREA rectal cream Place rectally 2 times daily      labetalol (NORMODYNE) 200 MG tablet Take 1 tablet by mouth 2 times daily 60 tablet 3    acetaminophen (TYLENOL) 500 MG tablet Take 1 tablet by mouth 4 times daily as needed for Pain 120 tablet 0     No current facility-administered medications for this visit.     No Known Allergies  Review of Systems  See HPI    Objective     Vitals:    24 1601   BP: (!) 165/93

## 2024-04-24 NOTE — PROGRESS NOTES
\"Have you been to the ER, urgent care clinic since your last visit?  Hospitalized since your last visit?\"    NO    “Have you seen or consulted any other health care providers outside of [facility] since your last visit?”    NO     “Have you had a pap smear?”    Yes,    Chief Complaint   Patient presents with    Hypertension    Hemorrhoids    postpartum depression     BP (!) 165/93 (Site: Right Upper Arm, Position: Sitting, Cuff Size: Large Adult)   Pulse 87   Temp 97.5 °F (36.4 °C) (Temporal)   Resp 18   Ht 1.6 m (5' 3\")   Wt 85.7 kg (189 lb)   LMP  (LMP Unknown)   SpO2 97%   Breastfeeding No   BMI 33.48 kg/m²

## 2024-05-31 ENCOUNTER — OFFICE VISIT (OUTPATIENT)
Facility: CLINIC | Age: 39
End: 2024-05-31
Payer: COMMERCIAL

## 2024-05-31 VITALS
HEIGHT: 63 IN | TEMPERATURE: 98.9 F | OXYGEN SATURATION: 99 % | BODY MASS INDEX: 35.44 KG/M2 | DIASTOLIC BLOOD PRESSURE: 84 MMHG | WEIGHT: 200 LBS | SYSTOLIC BLOOD PRESSURE: 136 MMHG | HEART RATE: 100 BPM | RESPIRATION RATE: 18 BRPM

## 2024-05-31 DIAGNOSIS — E66.01 SEVERE OBESITY (BMI 35.0-39.9) WITH COMORBIDITY (HCC): ICD-10-CM

## 2024-05-31 DIAGNOSIS — Z11.4 SCREENING FOR HIV WITHOUT PRESENCE OF RISK FACTORS: ICD-10-CM

## 2024-05-31 DIAGNOSIS — I10 HYPERTENSION, ESSENTIAL: Primary | ICD-10-CM

## 2024-05-31 DIAGNOSIS — E78.5 HYPERLIPIDEMIA, UNSPECIFIED HYPERLIPIDEMIA TYPE: ICD-10-CM

## 2024-05-31 DIAGNOSIS — J30.2 SEASONAL ALLERGIES: ICD-10-CM

## 2024-05-31 PROCEDURE — 3075F SYST BP GE 130 - 139MM HG: CPT | Performed by: NURSE PRACTITIONER

## 2024-05-31 PROCEDURE — 99214 OFFICE O/P EST MOD 30 MIN: CPT | Performed by: NURSE PRACTITIONER

## 2024-05-31 PROCEDURE — 3079F DIAST BP 80-89 MM HG: CPT | Performed by: NURSE PRACTITIONER

## 2024-05-31 RX ORDER — CETIRIZINE HYDROCHLORIDE 10 MG/1
10 TABLET ORAL DAILY
Qty: 90 TABLET | Refills: 0 | Status: SHIPPED | OUTPATIENT
Start: 2024-05-31

## 2024-05-31 RX ORDER — FLUTICASONE PROPIONATE 50 MCG
1 SPRAY, SUSPENSION (ML) NASAL DAILY
Qty: 32 G | Refills: 1 | Status: SHIPPED | OUTPATIENT
Start: 2024-05-31

## 2024-05-31 RX ORDER — AMLODIPINE BESYLATE 10 MG/1
10 TABLET ORAL
Qty: 90 TABLET | Refills: 3 | Status: SHIPPED | OUTPATIENT
Start: 2024-05-31 | End: 2025-05-31

## 2024-05-31 RX ORDER — LISINOPRIL 10 MG/1
10 TABLET ORAL EVERY MORNING
Qty: 90 TABLET | Refills: 3 | Status: SHIPPED | OUTPATIENT
Start: 2024-05-31

## 2024-05-31 NOTE — PROGRESS NOTES
morning.  Educated on medication, uses, side effects, as well as signs and symptoms that may merit immediate medical attention. Patient is not to share medication and use only as indicated.   Patient verbalized understanding and agreement.       Return in about 3 months (around 8/31/2024) for HTN lisinopril 10 mg qam, amlodipine 10 mg qhs.     Aspects of this note may have been generated using voice recognition software. Despite editing, there may be some syntax errors.    Niesha Young, APRN - CNP

## 2024-05-31 NOTE — PATIENT INSTRUCTIONS
Compre e utilize un humidificador (mist humidifier) en la noche para ayudar con los sintomas de la congestion nasal.

## 2024-08-30 ENCOUNTER — OFFICE VISIT (OUTPATIENT)
Facility: CLINIC | Age: 39
End: 2024-08-30
Payer: COMMERCIAL

## 2024-08-30 VITALS
HEART RATE: 85 BPM | WEIGHT: 210 LBS | DIASTOLIC BLOOD PRESSURE: 76 MMHG | RESPIRATION RATE: 18 BRPM | TEMPERATURE: 98.7 F | SYSTOLIC BLOOD PRESSURE: 125 MMHG | HEIGHT: 63 IN | OXYGEN SATURATION: 98 % | BODY MASS INDEX: 37.21 KG/M2

## 2024-08-30 DIAGNOSIS — K64.9 ACUTE HEMORRHOID: ICD-10-CM

## 2024-08-30 DIAGNOSIS — U07.1 COVID-19: ICD-10-CM

## 2024-08-30 DIAGNOSIS — R53.83 FATIGUE, UNSPECIFIED TYPE: ICD-10-CM

## 2024-08-30 DIAGNOSIS — E78.5 HYPERLIPIDEMIA, UNSPECIFIED HYPERLIPIDEMIA TYPE: ICD-10-CM

## 2024-08-30 DIAGNOSIS — R73.03 PREDIABETES: ICD-10-CM

## 2024-08-30 DIAGNOSIS — I10 HYPERTENSION, ESSENTIAL: ICD-10-CM

## 2024-08-30 DIAGNOSIS — R52 GENERALIZED BODY ACHES: Primary | ICD-10-CM

## 2024-08-30 DIAGNOSIS — E55.9 VITAMIN D DEFICIENCY: ICD-10-CM

## 2024-08-30 LAB
EXP DATE SOLUTION: ABNORMAL
EXP DATE SWAB: ABNORMAL
EXPIRATION DATE: ABNORMAL
LOT NUMBER POC: ABNORMAL
LOT NUMBER SOLUTION: ABNORMAL
LOT NUMBER SWAB: ABNORMAL
QUICKVUE INFLUENZA TEST: NEGATIVE
SARS-COV-2 RNA, POC: POSITIVE
VALID INTERNAL CONTROL, POC: NORMAL

## 2024-08-30 PROCEDURE — 3078F DIAST BP <80 MM HG: CPT | Performed by: NURSE PRACTITIONER

## 2024-08-30 PROCEDURE — 3074F SYST BP LT 130 MM HG: CPT | Performed by: NURSE PRACTITIONER

## 2024-08-30 PROCEDURE — 87635 SARS-COV-2 COVID-19 AMP PRB: CPT | Performed by: NURSE PRACTITIONER

## 2024-08-30 PROCEDURE — 87804 INFLUENZA ASSAY W/OPTIC: CPT | Performed by: NURSE PRACTITIONER

## 2024-08-30 PROCEDURE — 99214 OFFICE O/P EST MOD 30 MIN: CPT | Performed by: NURSE PRACTITIONER

## 2024-08-30 SDOH — ECONOMIC STABILITY: FOOD INSECURITY: WITHIN THE PAST 12 MONTHS, YOU WORRIED THAT YOUR FOOD WOULD RUN OUT BEFORE YOU GOT MONEY TO BUY MORE.: NEVER TRUE

## 2024-08-30 SDOH — ECONOMIC STABILITY: FOOD INSECURITY: WITHIN THE PAST 12 MONTHS, THE FOOD YOU BOUGHT JUST DIDN'T LAST AND YOU DIDN'T HAVE MONEY TO GET MORE.: NEVER TRUE

## 2024-08-30 SDOH — ECONOMIC STABILITY: INCOME INSECURITY: HOW HARD IS IT FOR YOU TO PAY FOR THE VERY BASICS LIKE FOOD, HOUSING, MEDICAL CARE, AND HEATING?: NOT HARD AT ALL

## 2024-08-30 NOTE — PROGRESS NOTES
[facility]  [address] [place] [address] [address]   [place] [address]  (P) [phone]   (F) [phone]    Subjective:   Dede Vasques is a 39 y.o. female  established here with complaints of Hypertension, Cough, Headache, Generalized Body Aches, and Otalgia   Reports hemorrhoids persistent  despite cream  Fatigue, dizziness, for the past several months..  Also with acute concerns of cough, headache, generalized bodyaches and recent exposure to COVID but has not been tested.    Patient Active Problem List   Diagnosis    Hypertension, essential    Hyperlipidemia      Current Outpatient Medications   Medication Sig Dispense Refill    lisinopril (PRINIVIL;ZESTRIL) 10 MG tablet Take 1 tablet by mouth every morning for blood pressure management. 90 tablet 3    amLODIPine (NORVASC) 10 MG tablet Take 1 tablet by mouth nightly for blood pressure management 90 tablet 3    fluticasone (FLONASE) 50 MCG/ACT nasal spray 1 spray by Each Nostril route daily 32 g 1    cetirizine (ZYRTEC) 10 MG tablet Take 1 tablet by mouth daily 90 tablet 0    aspirin 81 MG EC tablet Take 1 tablet by mouth daily      hydrocortisone (ANUSOL-HC) 2.5 % CREA rectal cream Place rectally 2 times daily      acetaminophen (TYLENOL) 500 MG tablet Take 1 tablet by mouth 4 times daily as needed for Pain 120 tablet 0     No current facility-administered medications for this visit.      No Known Allergies   Past Medical History:   Diagnosis Date    GERD (gastroesophageal reflux disease)     Hypertension       Past Surgical History:   Procedure Laterality Date    SALPINGECTOMY Bilateral       Family History   Problem Relation Age of Onset    Diabetes Mother       Social History     Tobacco Use    Smoking status: Never    Smokeless tobacco: Never   Substance Use Topics    Alcohol use: Never        Review of Systems    Pertinent items are noted in HPI.     Objective:   /76 (Site: Right Upper Arm, Position: Sitting,

## 2024-08-30 NOTE — PROGRESS NOTES
\"Have you been to the ER, urgent care clinic since your last visit?  Hospitalized since your last visit?\"    NO    “Have you seen or consulted any other health care providers outside of [facility] since your last visit?”    NO    Have you had a mammogram?”   NO    No breast cancer screening on file      “Have you had a pap smear?”    NO    No cervical cancer screening on file         Chief Complaint   Patient presents with    Hypertension    Cough    Headache    Generalized Body Aches    Otalgia     /76 (Site: Right Upper Arm, Position: Sitting, Cuff Size: Large Adult)   Pulse 85   Temp 98.7 °F (37.1 °C) (Temporal)   Resp 18   Ht 1.6 m (5' 3\")   Wt 95.3 kg (210 lb)   SpO2 98%   BMI 37.20 kg/m²       Click Here for Release of Records Request

## 2024-10-06 LAB
ALBUMIN SERPL-MCNC: 4.2 G/DL (ref 3.9–4.9)
ALP SERPL-CCNC: 66 IU/L (ref 44–121)
ALT SERPL-CCNC: 28 IU/L (ref 0–32)
AST SERPL-CCNC: 24 IU/L (ref 0–40)
BASOPHILS # BLD AUTO: 0 X10E3/UL (ref 0–0.2)
BASOPHILS NFR BLD AUTO: 0 %
BILIRUB SERPL-MCNC: 0.3 MG/DL (ref 0–1.2)
BUN SERPL-MCNC: 11 MG/DL (ref 6–24)
BUN/CREAT SERPL: 17 (ref 9–23)
CALCIUM SERPL-MCNC: 9.5 MG/DL (ref 8.7–10.2)
CHLORIDE SERPL-SCNC: 105 MMOL/L (ref 96–106)
CHOLEST SERPL-MCNC: 182 MG/DL (ref 100–199)
CO2 SERPL-SCNC: 22 MMOL/L (ref 20–29)
CREAT SERPL-MCNC: 0.63 MG/DL (ref 0.57–1)
EGFRCR SERPLBLD CKD-EPI 2021: 115 ML/MIN/1.73
EOSINOPHIL # BLD AUTO: 0.2 X10E3/UL (ref 0–0.4)
EOSINOPHIL NFR BLD AUTO: 3 %
ERYTHROCYTE [DISTWIDTH] IN BLOOD BY AUTOMATED COUNT: 14.5 % (ref 11.7–15.4)
FOLATE SERPL-MCNC: >20 NG/ML
GLOBULIN SER CALC-MCNC: 2.8 G/DL (ref 1.5–4.5)
GLUCOSE SERPL-MCNC: 96 MG/DL (ref 70–99)
HCT VFR BLD AUTO: 37.3 % (ref 34–46.6)
HDLC SERPL-MCNC: 47 MG/DL
HGB BLD-MCNC: 11.4 G/DL (ref 11.1–15.9)
IMM GRANULOCYTES # BLD AUTO: 0 X10E3/UL (ref 0–0.1)
IMM GRANULOCYTES NFR BLD AUTO: 0 %
IRON SATN MFR SERPL: 22 % (ref 15–55)
IRON SERPL-MCNC: 89 UG/DL (ref 27–159)
LDLC SERPL CALC-MCNC: 101 MG/DL (ref 0–99)
LYMPHOCYTES # BLD AUTO: 2.5 X10E3/UL (ref 0.7–3.1)
LYMPHOCYTES NFR BLD AUTO: 34 %
MCH RBC QN AUTO: 27.4 PG (ref 26.6–33)
MCHC RBC AUTO-ENTMCNC: 30.6 G/DL (ref 31.5–35.7)
MCV RBC AUTO: 90 FL (ref 79–97)
MONOCYTES # BLD AUTO: 0.4 X10E3/UL (ref 0.1–0.9)
MONOCYTES NFR BLD AUTO: 6 %
NEUTROPHILS # BLD AUTO: 4.2 X10E3/UL (ref 1.4–7)
NEUTROPHILS NFR BLD AUTO: 57 %
PLATELET # BLD AUTO: 290 X10E3/UL (ref 150–450)
POTASSIUM SERPL-SCNC: 4.1 MMOL/L (ref 3.5–5.2)
PROT SERPL-MCNC: 7 G/DL (ref 6–8.5)
RBC # BLD AUTO: 4.16 X10E6/UL (ref 3.77–5.28)
SODIUM SERPL-SCNC: 141 MMOL/L (ref 134–144)
TIBC SERPL-MCNC: 396 UG/DL (ref 250–450)
TRIGL SERPL-MCNC: 196 MG/DL (ref 0–149)
UIBC SERPL-MCNC: 307 UG/DL (ref 131–425)
VIT B12 SERPL-MCNC: 405 PG/ML (ref 232–1245)
VLDLC SERPL CALC-MCNC: 34 MG/DL (ref 5–40)
WBC # BLD AUTO: 7.3 X10E3/UL (ref 3.4–10.8)

## 2024-10-07 LAB
25(OH)D3+25(OH)D2 SERPL-MCNC: 32.1 NG/ML (ref 30–100)
ALBUMIN/CREAT UR: <7 MG/G CREAT (ref 0–29)
CREAT UR-MCNC: 43 MG/DL
HBA1C MFR BLD: 5.8 % (ref 4.8–5.6)
MICROALBUMIN UR-MCNC: <3 UG/ML

## 2025-02-25 ENCOUNTER — OFFICE VISIT (OUTPATIENT)
Facility: CLINIC | Age: 40
End: 2025-02-25
Payer: COMMERCIAL

## 2025-02-25 VITALS
OXYGEN SATURATION: 99 % | DIASTOLIC BLOOD PRESSURE: 78 MMHG | BODY MASS INDEX: 36.5 KG/M2 | TEMPERATURE: 98.6 F | HEIGHT: 63 IN | RESPIRATION RATE: 18 BRPM | SYSTOLIC BLOOD PRESSURE: 136 MMHG | WEIGHT: 206 LBS | HEART RATE: 97 BPM

## 2025-02-25 DIAGNOSIS — K64.9 ACUTE HEMORRHOID: Primary | ICD-10-CM

## 2025-02-25 DIAGNOSIS — M54.50 ACUTE BILATERAL LOW BACK PAIN WITHOUT SCIATICA: ICD-10-CM

## 2025-02-25 DIAGNOSIS — R10.84 GENERALIZED ABDOMINAL PAIN: ICD-10-CM

## 2025-02-25 PROCEDURE — 3075F SYST BP GE 130 - 139MM HG: CPT | Performed by: NURSE PRACTITIONER

## 2025-02-25 PROCEDURE — 99214 OFFICE O/P EST MOD 30 MIN: CPT | Performed by: NURSE PRACTITIONER

## 2025-02-25 PROCEDURE — 3078F DIAST BP <80 MM HG: CPT | Performed by: NURSE PRACTITIONER

## 2025-02-25 RX ORDER — HYDROCORTISONE 25 MG/G
CREAM TOPICAL 2 TIMES DAILY
Qty: 28 G | Refills: 0 | Status: SHIPPED | OUTPATIENT
Start: 2025-02-25

## 2025-02-25 RX ORDER — PREDNISONE 20 MG/1
TABLET ORAL
Qty: 20 TABLET | Refills: 0 | Status: SHIPPED | OUTPATIENT
Start: 2025-02-25

## 2025-02-25 SDOH — ECONOMIC STABILITY: FOOD INSECURITY: WITHIN THE PAST 12 MONTHS, YOU WORRIED THAT YOUR FOOD WOULD RUN OUT BEFORE YOU GOT MONEY TO BUY MORE.: NEVER TRUE

## 2025-02-25 SDOH — ECONOMIC STABILITY: FOOD INSECURITY: WITHIN THE PAST 12 MONTHS, THE FOOD YOU BOUGHT JUST DIDN'T LAST AND YOU DIDN'T HAVE MONEY TO GET MORE.: NEVER TRUE

## 2025-02-25 ASSESSMENT — PATIENT HEALTH QUESTIONNAIRE - PHQ9
SUM OF ALL RESPONSES TO PHQ QUESTIONS 1-9: 0
2. FEELING DOWN, DEPRESSED OR HOPELESS: NOT AT ALL
SUM OF ALL RESPONSES TO PHQ QUESTIONS 1-9: 0
SUM OF ALL RESPONSES TO PHQ QUESTIONS 1-9: 0
1. LITTLE INTEREST OR PLEASURE IN DOING THINGS: NOT AT ALL
SUM OF ALL RESPONSES TO PHQ9 QUESTIONS 1 & 2: 0
SUM OF ALL RESPONSES TO PHQ QUESTIONS 1-9: 0

## 2025-02-25 NOTE — PROGRESS NOTES
[facility]  [address] [place] [address] [address]   [place] [address]  (P) [phone]   (F) [phone]    Subjective:   Dede Vasques is a 39 y.o. female  established here with complaints of Hemorrhoids   Reports since the birth of her recent child have have multiple flare ups.  Reports having regular bowel movements twice a day that are soft does not have to strain to use the bathroom.    Back pain daily worse in the evening, has a year and a half daughter that she is caring for and has to be constantly bending and taking care of her.  Has noticed back pain has gotten worse since the child of her daughter.    Abdominal pain with no changes in bowel habits, generalized, no nausea, vomiting, fever, chills, diarrhea, constipation.  Lasting 15 minutes at a time happens once or twice a week.  No known injury.    Patient Active Problem List   Diagnosis    Hypertension, essential    Hyperlipidemia      Current Outpatient Medications   Medication Sig Dispense Refill    hydrocortisone (ANUSOL-HC) 2.5 % CREA rectal cream Place rectally 2 times daily 28 g 0    predniSONE (DELTASONE) 20 MG tablet Take 3 tabs po daily x 2 days then 2 tabs daily x 4 days then 1 tab daily x 4 days then 1/2 tab daily x 4 days. 20 tablet 0    lisinopril (PRINIVIL;ZESTRIL) 10 MG tablet Take 1 tablet by mouth every morning for blood pressure management. 90 tablet 3    amLODIPine (NORVASC) 10 MG tablet Take 1 tablet by mouth nightly for blood pressure management 90 tablet 3    fluticasone (FLONASE) 50 MCG/ACT nasal spray 1 spray by Each Nostril route daily 32 g 1    cetirizine (ZYRTEC) 10 MG tablet Take 1 tablet by mouth daily 90 tablet 0    aspirin 81 MG EC tablet Take 1 tablet by mouth daily      acetaminophen (TYLENOL) 500 MG tablet Take 1 tablet by mouth 4 times daily as needed for Pain 120 tablet 0     No current facility-administered medications for this visit.      No Known Allergies   Past Medical

## 2025-02-25 NOTE — PROGRESS NOTES
\"Have you been to the ER, urgent care clinic since your last visit?  Hospitalized since your last visit?\"    NO    “Have you seen or consulted any other health care providers outside our system since your last visit?”    NO      Chief Complaint   Patient presents with    Hemorrhoids     /78 (Site: Right Upper Arm, Position: Sitting, Cuff Size: Large Adult)   Pulse 97   Temp 98.6 °F (37 °C) (Temporal)   Resp 18   Ht 1.6 m (5' 3\")   Wt 93.4 kg (206 lb)   SpO2 99%   BMI 36.49 kg/m²

## 2025-07-01 DIAGNOSIS — I10 HYPERTENSION, ESSENTIAL: ICD-10-CM

## 2025-07-02 RX ORDER — AMLODIPINE BESYLATE 10 MG/1
TABLET ORAL
Qty: 90 TABLET | Refills: 3 | Status: SHIPPED | OUTPATIENT
Start: 2025-07-02

## 2025-07-02 NOTE — TELEPHONE ENCOUNTER
Requested Prescriptions     Pending Prescriptions Disp Refills    amLODIPine (NORVASC) 10 MG tablet [Pharmacy Med Name: AMLODIPINE BESYLATE 10MGTABLETS] 90 tablet 3     Sig: TAKE 1 TABLET BY MOUTH EVERY NIGHT FOR BLOOD PRESSURE